# Patient Record
Sex: FEMALE | URBAN - METROPOLITAN AREA
[De-identification: names, ages, dates, MRNs, and addresses within clinical notes are randomized per-mention and may not be internally consistent; named-entity substitution may affect disease eponyms.]

---

## 2019-01-02 ENCOUNTER — APPOINTMENT (RX ONLY)
Dept: URBAN - METROPOLITAN AREA CLINIC 2 | Facility: CLINIC | Age: 69
Setting detail: DERMATOLOGY
End: 2019-01-02

## 2019-01-02 DIAGNOSIS — Z71.89 OTHER SPECIFIED COUNSELING: ICD-10-CM

## 2019-01-02 DIAGNOSIS — D22 MELANOCYTIC NEVI: ICD-10-CM

## 2019-01-02 PROBLEM — D23.5 OTHER BENIGN NEOPLASM OF SKIN OF TRUNK: Status: ACTIVE | Noted: 2019-01-02

## 2019-01-02 PROBLEM — D22.5 MELANOCYTIC NEVI OF TRUNK: Status: ACTIVE | Noted: 2019-01-02

## 2019-01-02 PROBLEM — J30.1 ALLERGIC RHINITIS DUE TO POLLEN: Status: ACTIVE | Noted: 2019-01-02

## 2019-01-02 PROBLEM — J45.909 UNSPECIFIED ASTHMA, UNCOMPLICATED: Status: ACTIVE | Noted: 2019-01-02

## 2019-01-02 PROCEDURE — 99213 OFFICE O/P EST LOW 20 MIN: CPT

## 2019-01-02 PROCEDURE — ? OBSERVATION

## 2019-01-02 ASSESSMENT — LOCATION DETAILED DESCRIPTION DERM
LOCATION DETAILED: RIGHT BUTTOCK
LOCATION DETAILED: LEFT LATERAL ABDOMEN

## 2019-01-02 ASSESSMENT — LOCATION SIMPLE DESCRIPTION DERM
LOCATION SIMPLE: ABDOMEN
LOCATION SIMPLE: RIGHT BUTTOCK

## 2019-01-02 ASSESSMENT — LOCATION ZONE DERM: LOCATION ZONE: TRUNK

## 2019-01-02 NOTE — PROCEDURE: OBSERVATION
Size Of Lesion In Cm (Optional): 0
Body Location Override (Optional - Billing Will Still Be Based On Selected Body Map Location If Applicable): R buttock
Detail Level: Detailed
Body Location Override (Optional - Billing Will Still Be Based On Selected Body Map Location If Applicable): L lateral waistline

## 2019-04-30 ENCOUNTER — APPOINTMENT (RX ONLY)
Dept: URBAN - METROPOLITAN AREA CLINIC 2 | Facility: CLINIC | Age: 69
Setting detail: DERMATOLOGY
End: 2019-04-30

## 2019-04-30 DIAGNOSIS — D22 MELANOCYTIC NEVI: ICD-10-CM

## 2019-04-30 PROBLEM — D22.5 MELANOCYTIC NEVI OF TRUNK: Status: ACTIVE | Noted: 2019-04-30

## 2019-04-30 PROCEDURE — ? OBSERVATION

## 2019-04-30 PROCEDURE — 99212 OFFICE O/P EST SF 10 MIN: CPT

## 2019-04-30 ASSESSMENT — LOCATION ZONE DERM: LOCATION ZONE: TRUNK

## 2019-04-30 ASSESSMENT — LOCATION SIMPLE DESCRIPTION DERM: LOCATION SIMPLE: RIGHT BUTTOCK

## 2019-04-30 ASSESSMENT — LOCATION DETAILED DESCRIPTION DERM: LOCATION DETAILED: RIGHT BUTTOCK

## 2019-04-30 NOTE — PROCEDURE: OBSERVATION
Body Location Override (Optional - Billing Will Still Be Based On Selected Body Map Location If Applicable): R buttock
Detail Level: Detailed
Size Of Lesion In Cm (Optional): 0

## 2020-08-11 ENCOUNTER — APPOINTMENT (RX ONLY)
Dept: URBAN - METROPOLITAN AREA CLINIC 2 | Facility: CLINIC | Age: 70
Setting detail: DERMATOLOGY
End: 2020-08-11

## 2020-08-11 VITALS — TEMPERATURE: 97.1 F

## 2020-08-11 DIAGNOSIS — Z41.9 ENCOUNTER FOR PROCEDURE FOR PURPOSES OTHER THAN REMEDYING HEALTH STATE, UNSPECIFIED: ICD-10-CM

## 2020-08-11 DIAGNOSIS — D22 MELANOCYTIC NEVI: ICD-10-CM

## 2020-08-11 DIAGNOSIS — L81.4 OTHER MELANIN HYPERPIGMENTATION: ICD-10-CM

## 2020-08-11 DIAGNOSIS — L82.1 OTHER SEBORRHEIC KERATOSIS: ICD-10-CM

## 2020-08-11 DIAGNOSIS — Z71.89 OTHER SPECIFIED COUNSELING: ICD-10-CM

## 2020-08-11 PROBLEM — D22.61 MELANOCYTIC NEVI OF RIGHT UPPER LIMB, INCLUDING SHOULDER: Status: ACTIVE | Noted: 2020-08-11

## 2020-08-11 PROBLEM — D23.5 OTHER BENIGN NEOPLASM OF SKIN OF TRUNK: Status: ACTIVE | Noted: 2020-08-11

## 2020-08-11 PROBLEM — D22.5 MELANOCYTIC NEVI OF TRUNK: Status: ACTIVE | Noted: 2020-08-11

## 2020-08-11 PROCEDURE — ? MEDICAL CONSULTATION: BROWN SPOTS

## 2020-08-11 PROCEDURE — ? COUNSELING

## 2020-08-11 PROCEDURE — 99214 OFFICE O/P EST MOD 30 MIN: CPT

## 2020-08-11 PROCEDURE — ? DEFER

## 2020-08-11 PROCEDURE — ? RECOMMENDATIONS

## 2020-08-11 ASSESSMENT — LOCATION DETAILED DESCRIPTION DERM
LOCATION DETAILED: INFERIOR THORACIC SPINE
LOCATION DETAILED: LEFT INFERIOR LATERAL MALAR CHEEK
LOCATION DETAILED: RIGHT ANTERIOR DISTAL THIGH
LOCATION DETAILED: RIGHT POSTERIOR SHOULDER
LOCATION DETAILED: RIGHT INFERIOR CENTRAL MALAR CHEEK

## 2020-08-11 ASSESSMENT — LOCATION SIMPLE DESCRIPTION DERM
LOCATION SIMPLE: UPPER BACK
LOCATION SIMPLE: RIGHT CHEEK
LOCATION SIMPLE: LEFT CHEEK
LOCATION SIMPLE: RIGHT SHOULDER
LOCATION SIMPLE: RIGHT THIGH

## 2020-08-11 ASSESSMENT — LOCATION ZONE DERM
LOCATION ZONE: TRUNK
LOCATION ZONE: FACE
LOCATION ZONE: LEG
LOCATION ZONE: ARM

## 2020-08-11 NOTE — PROCEDURE: RECOMMENDATIONS
Detail Level: Generalized
Recommendation Preamble: The following recommendations were made during the visit: Mj FELDER retinol

## 2020-11-30 ENCOUNTER — APPOINTMENT (RX ONLY)
Dept: URBAN - METROPOLITAN AREA CLINIC 41 | Facility: CLINIC | Age: 70
Setting detail: DERMATOLOGY
End: 2020-11-30

## 2020-11-30 DIAGNOSIS — Z41.9 ENCOUNTER FOR PROCEDURE FOR PURPOSES OTHER THAN REMEDYING HEALTH STATE, UNSPECIFIED: ICD-10-CM

## 2020-11-30 PROCEDURE — ? SYNERON IPL

## 2020-11-30 ASSESSMENT — LOCATION ZONE DERM: LOCATION ZONE: FACE

## 2020-11-30 ASSESSMENT — LOCATION DETAILED DESCRIPTION DERM: LOCATION DETAILED: LEFT INFERIOR CENTRAL MALAR CHEEK

## 2020-11-30 ASSESSMENT — LOCATION SIMPLE DESCRIPTION DERM: LOCATION SIMPLE: LEFT CHEEK

## 2020-11-30 NOTE — PROCEDURE: SYNERON IPL
Treatment Number: 1
External Cooling Fan Speed: 5
Pre-Procedure: The treatment areas identified by the patient were cleansed, gel was applied and treatment was performed with the parameters mentioned above.
Consent: Written consent obtained, risks reviewed including but not limited to crusting, scabbing, blistering, scarring, darker or lighter pigmentary change, bruising, and/or incomplete response.
Rf Energy: 10
Spot Size(S): 22 x 35 mm
Pulse Type: Short
Post-Care Instructions: I reviewed with the patient in detail post-care instructions. Patient should stay away from the sun and wear sun protection until treated areas are fully healed.
Post-Procedure: Following the procedure the post care instructions were reviewed with the patient.
Wavelenth: KAUR 470-980nm
Detail Level: Zone

## 2021-01-18 ENCOUNTER — APPOINTMENT (RX ONLY)
Dept: URBAN - METROPOLITAN AREA CLINIC 41 | Facility: CLINIC | Age: 71
Setting detail: DERMATOLOGY
End: 2021-01-18

## 2021-01-18 DIAGNOSIS — Z41.9 ENCOUNTER FOR PROCEDURE FOR PURPOSES OTHER THAN REMEDYING HEALTH STATE, UNSPECIFIED: ICD-10-CM

## 2021-01-18 PROCEDURE — ? SYNERON IPL

## 2021-01-18 ASSESSMENT — LOCATION DETAILED DESCRIPTION DERM: LOCATION DETAILED: RIGHT INFERIOR CENTRAL MALAR CHEEK

## 2021-01-18 ASSESSMENT — LOCATION SIMPLE DESCRIPTION DERM: LOCATION SIMPLE: RIGHT CHEEK

## 2021-01-18 ASSESSMENT — LOCATION ZONE DERM: LOCATION ZONE: FACE

## 2021-01-18 NOTE — PROCEDURE: SYNERON IPL
Post-Procedure: Following the procedure the post care instructions were reviewed with the patient.
Total Pulses: 1
Post-Care Instructions: I reviewed with the patient in detail post-care instructions. Patient should stay away from the sun and wear sun protection until treated areas are fully healed.
Rf Energy: 10
Consent: Written consent obtained, risks reviewed including but not limited to crusting, scabbing, blistering, scarring, darker or lighter pigmentary change, bruising, and/or incomplete response.
Pre-Procedure: The treatment areas identified by the patient were cleansed, gel was applied and treatment was performed with the parameters mentioned above.
Detail Level: Zone
Wavelenth: KAUR 470-980nm
External Cooling Fan Speed: 5
Spot Size(S): 22 x 35 mm
Pulse Type: Short

## 2021-03-24 ENCOUNTER — APPOINTMENT (RX ONLY)
Dept: URBAN - METROPOLITAN AREA CLINIC 41 | Facility: CLINIC | Age: 71
Setting detail: DERMATOLOGY
End: 2021-03-24

## 2021-03-24 DIAGNOSIS — Z41.9 ENCOUNTER FOR PROCEDURE FOR PURPOSES OTHER THAN REMEDYING HEALTH STATE, UNSPECIFIED: ICD-10-CM

## 2021-03-24 PROCEDURE — ? COSMETIC CONSULTATION: LASER RESURFACING

## 2021-03-24 PROCEDURE — ? BOTOX

## 2021-03-24 PROCEDURE — ? OTHER

## 2021-03-24 PROCEDURE — ? COSMETIC CONSULTATION: FILLERS

## 2021-03-24 ASSESSMENT — LOCATION SIMPLE DESCRIPTION DERM
LOCATION SIMPLE: RIGHT CHEEK
LOCATION SIMPLE: INFERIOR FOREHEAD
LOCATION SIMPLE: LEFT CHEEK

## 2021-03-24 ASSESSMENT — LOCATION DETAILED DESCRIPTION DERM
LOCATION DETAILED: RIGHT INFERIOR CENTRAL MALAR CHEEK
LOCATION DETAILED: LEFT INFERIOR CENTRAL MALAR CHEEK
LOCATION DETAILED: INFERIOR MID FOREHEAD

## 2021-03-24 ASSESSMENT — LOCATION ZONE DERM: LOCATION ZONE: FACE

## 2021-03-24 NOTE — PROCEDURE: OTHER
Render Risk Assessment In Note?: no
Detail Level: Simple
Other (Free Text): Discussed off label use for the glabella; discussed several treatment sessions. Risks and limitations discussed.
Note Text (......Xxx Chief Complaint.): This diagnosis correlates with the

## 2021-03-24 NOTE — PROCEDURE: BOTOX
Levator Labii Superioris Units: 0
Show Orbicularis Oculi Units: Yes
Consent: Written consent obtained. Risks include but not limited to lid/brow ptosis, bruising, swelling, diplopia temporary effect, incomplete chemical denervation
Lot #: [id number]
Show Right And Left Periorbital Units: No
Expiration Date (Month Year): 06/2023
Additional Area 1 Location: glabella, forehead
Additional Area 5 Location: neck
Detail Level: Simple
Dilution (U/0.1 Cc): 1
Additional Area 4 Location: chin
Additional Area 3 Location: see diagram
Additional Area 1 Units: 25

## 2021-03-24 NOTE — PROCEDURE: COSMETIC CONSULTATION: LASER RESURFACING
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
Detail Level: Zone

## 2021-05-11 ENCOUNTER — APPOINTMENT (RX ONLY)
Dept: URBAN - METROPOLITAN AREA CLINIC 41 | Facility: CLINIC | Age: 71
Setting detail: DERMATOLOGY
End: 2021-05-11

## 2021-05-11 DIAGNOSIS — Z41.9 ENCOUNTER FOR PROCEDURE FOR PURPOSES OTHER THAN REMEDYING HEALTH STATE, UNSPECIFIED: ICD-10-CM

## 2021-05-11 PROCEDURE — ? SYNERON IPL

## 2021-05-11 ASSESSMENT — LOCATION SIMPLE DESCRIPTION DERM: LOCATION SIMPLE: LEFT CHEEK

## 2021-05-11 ASSESSMENT — LOCATION ZONE DERM: LOCATION ZONE: FACE

## 2021-05-11 ASSESSMENT — LOCATION DETAILED DESCRIPTION DERM: LOCATION DETAILED: LEFT CENTRAL MALAR CHEEK

## 2021-05-11 NOTE — PROCEDURE: SYNERON IPL
Rf Energy: 25
Total Pulses: 1
Post-Procedure: Following the procedure the post care instructions were reviewed with the patient.
Post-Care Instructions: I reviewed with the patient in detail post-care instructions. Patient should stay away from the sun and wear sun protection until treated areas are fully healed.
Pre-Procedure: The treatment areas identified by the patient were cleansed, gel was applied and treatment was performed with the parameters mentioned above.
Detail Level: Detailed
Wavelenth: -980nm
Consent: Written consent obtained, risks reviewed including but not limited to crusting, scabbing, blistering, scarring, darker or lighter pigmentary change, bruising, and/or incomplete response.
Pulse Type: Short
Spot Size(S): 22 x 35 mm
External Cooling Fan Speed: 5
Fluence: 16

## 2021-12-01 ENCOUNTER — APPOINTMENT (RX ONLY)
Dept: URBAN - METROPOLITAN AREA CLINIC 41 | Facility: CLINIC | Age: 71
Setting detail: DERMATOLOGY
End: 2021-12-01

## 2021-12-01 DIAGNOSIS — Z41.9 ENCOUNTER FOR PROCEDURE FOR PURPOSES OTHER THAN REMEDYING HEALTH STATE, UNSPECIFIED: ICD-10-CM

## 2021-12-01 PROCEDURE — ? CHEMICAL PEEL JESSNER/TCA

## 2021-12-01 ASSESSMENT — LOCATION ZONE DERM: LOCATION ZONE: FACE

## 2021-12-01 ASSESSMENT — LOCATION DETAILED DESCRIPTION DERM: LOCATION DETAILED: LEFT INFERIOR CENTRAL MALAR CHEEK

## 2021-12-01 ASSESSMENT — LOCATION SIMPLE DESCRIPTION DERM: LOCATION SIMPLE: LEFT CHEEK

## 2021-12-01 NOTE — PROCEDURE: CHEMICAL PEEL JESSNER/TCA
Consent: Prior to the procedure, verbal consent was obtained and risks were reviewed, including but not limited to: redness, peeling, blistering, pigmentary change, scarring, infection, and pain.\\nPt denies allergy to aspirin/salicylic acid. Verbal consent obtained by pt.
Treatment Number: 0
Erythema: mild
Frost (0,1+,2+,3+,4+): 4+
Chemical Peel: Jessners
Post Peel Care: After the procedure, the treatment area was washed with soap and water, and a post-peel cream was applied. Sun protection and post-care instructions were reviewed with the patient.
Detail Level: Zone
Post-Care Instructions: I reviewed with the patient in detail post-care instructions. Patient should avoid sun exposure and wear sun protection. Do not pick at peeling skin. Ok to wash face 4 hours post Jessners peel, 12 hours post Glytone peels. Moisturizer frequently & PRN. return to normal skin care when peeling is complete, including daily SPF.
Number Of Coats: 6
Prep: The treated area was degreased with pre-peel cleanser, and vaseline was applied for protection of mucous membranes.

## 2022-01-18 ENCOUNTER — APPOINTMENT (RX ONLY)
Dept: URBAN - METROPOLITAN AREA CLINIC 41 | Facility: CLINIC | Age: 72
Setting detail: DERMATOLOGY
End: 2022-01-18

## 2022-01-18 DIAGNOSIS — Z41.9 ENCOUNTER FOR PROCEDURE FOR PURPOSES OTHER THAN REMEDYING HEALTH STATE, UNSPECIFIED: ICD-10-CM

## 2022-01-18 PROCEDURE — ? SYNERON IPL

## 2022-01-18 ASSESSMENT — LOCATION DETAILED DESCRIPTION DERM: LOCATION DETAILED: LEFT INFERIOR CENTRAL MALAR CHEEK

## 2022-01-18 ASSESSMENT — LOCATION SIMPLE DESCRIPTION DERM: LOCATION SIMPLE: LEFT CHEEK

## 2022-01-18 ASSESSMENT — LOCATION ZONE DERM: LOCATION ZONE: FACE

## 2022-01-18 NOTE — PROCEDURE: SYNERON IPL
Fluence: 17
Wavelenth: -980nm
External Cooling Fan Speed: 5
Detail Level: Detailed
Pulse Type: Short
Number Of Passes: 1
Spot Size(S): 22 x 35 mm
Post-Care Instructions: I reviewed with the patient in detail post-care instructions. Patient should stay away from the sun and wear sun protection until treated areas are fully healed.
Post-Procedure: Following the procedure the post care instructions were reviewed with the patient.
Rf Energy: 25
Consent: Written consent obtained, risks reviewed including but not limited to crusting, scabbing, blistering, scarring, darker or lighter pigmentary change, bruising, and/or incomplete response.
Pre-Procedure: The treatment areas identified by the patient were cleansed, gel was applied and treatment was performed with the parameters mentioned above.

## 2022-02-21 ENCOUNTER — APPOINTMENT (RX ONLY)
Dept: URBAN - METROPOLITAN AREA CLINIC 41 | Facility: CLINIC | Age: 72
Setting detail: DERMATOLOGY
End: 2022-02-21

## 2022-02-21 DIAGNOSIS — Z41.9 ENCOUNTER FOR PROCEDURE FOR PURPOSES OTHER THAN REMEDYING HEALTH STATE, UNSPECIFIED: ICD-10-CM

## 2022-02-21 PROCEDURE — ? SYNERON IPL

## 2022-02-21 ASSESSMENT — LOCATION DETAILED DESCRIPTION DERM
LOCATION DETAILED: LEFT MEDIAL MANDIBULAR CHEEK
LOCATION DETAILED: LEFT CENTRAL MALAR CHEEK

## 2022-02-21 ASSESSMENT — LOCATION SIMPLE DESCRIPTION DERM: LOCATION SIMPLE: LEFT CHEEK

## 2022-02-21 ASSESSMENT — LOCATION ZONE DERM: LOCATION ZONE: FACE

## 2022-02-21 NOTE — PROCEDURE: SYNERON IPL
Spot Size(S): 22 x 35 mm
External Cooling Fan Speed: 5
Post-Procedure: Following the procedure the post care instructions were reviewed with the patient.
Detail Level: Detailed
Post-Care Instructions: I reviewed with the patient in detail post-care instructions. Patient should stay away from the sun and wear sun protection until treated areas are fully healed.
Wavelenth: -980nm
Fluence: 17
Number Of Passes: 1
Pulse Type: Short
Rf Energy: 25
Consent: Written consent obtained, risks reviewed including but not limited to crusting, scabbing, blistering, scarring, darker or lighter pigmentary change, bruising, and/or incomplete response.
Pre-Procedure: The treatment areas identified by the patient were cleansed, gel was applied and treatment was performed with the parameters mentioned above.

## 2024-10-01 ENCOUNTER — APPOINTMENT (RX ONLY)
Dept: URBAN - METROPOLITAN AREA CLINIC 2 | Facility: CLINIC | Age: 74
Setting detail: DERMATOLOGY
End: 2024-10-01

## 2024-10-01 DIAGNOSIS — L82.1 OTHER SEBORRHEIC KERATOSIS: ICD-10-CM

## 2024-10-01 DIAGNOSIS — D22 MELANOCYTIC NEVI: ICD-10-CM

## 2024-10-01 DIAGNOSIS — R20.2 PARESTHESIA OF SKIN: ICD-10-CM

## 2024-10-01 DIAGNOSIS — L57.8 OTHER SKIN CHANGES DUE TO CHRONIC EXPOSURE TO NONIONIZING RADIATION: ICD-10-CM

## 2024-10-01 DIAGNOSIS — Z71.89 OTHER SPECIFIED COUNSELING: ICD-10-CM

## 2024-10-01 PROBLEM — D22.71 MELANOCYTIC NEVI OF RIGHT LOWER LIMB, INCLUDING HIP: Status: ACTIVE | Noted: 2024-10-01

## 2024-10-01 PROBLEM — D22.5 MELANOCYTIC NEVI OF TRUNK: Status: ACTIVE | Noted: 2024-10-01

## 2024-10-01 PROCEDURE — 99213 OFFICE O/P EST LOW 20 MIN: CPT

## 2024-10-01 PROCEDURE — ? SUNSCREEN RECOMMENDATIONS

## 2024-10-01 PROCEDURE — ? OBSERVATION AND MEASURE

## 2024-10-01 PROCEDURE — ? PHOTO-DOCUMENTATION

## 2024-10-01 PROCEDURE — ? COUNSELING

## 2024-10-01 ASSESSMENT — LOCATION SIMPLE DESCRIPTION DERM
LOCATION SIMPLE: RIGHT KNEE
LOCATION SIMPLE: LEFT PRETIBIAL REGION
LOCATION SIMPLE: RIGHT UPPER BACK
LOCATION SIMPLE: UPPER BACK

## 2024-10-01 ASSESSMENT — LOCATION DETAILED DESCRIPTION DERM
LOCATION DETAILED: RIGHT INFERIOR UPPER BACK
LOCATION DETAILED: LEFT PROXIMAL PRETIBIAL REGION
LOCATION DETAILED: INFERIOR THORACIC SPINE
LOCATION DETAILED: RIGHT MEDIAL KNEE

## 2024-10-01 ASSESSMENT — LOCATION ZONE DERM
LOCATION ZONE: LEG
LOCATION ZONE: TRUNK

## 2024-10-01 NOTE — PROCEDURE: COUNSELING
Detail Level: Zone
Sunscreen Recommendations: Discussed importance of regular photo protection with sun screen and photo protective clothing
Detail Level: Generalized
Sunscreen Recommendations: At home photo documentation will help tracking for any changes.
Capsaicin Cream Recommendations: Prn
Detail Level: Simple

## 2024-11-05 ENCOUNTER — APPOINTMENT (RX ONLY)
Dept: URBAN - METROPOLITAN AREA CLINIC 41 | Facility: CLINIC | Age: 74
Setting detail: DERMATOLOGY
End: 2024-11-05

## 2024-11-05 DIAGNOSIS — Z41.9 ENCOUNTER FOR PROCEDURE FOR PURPOSES OTHER THAN REMEDYING HEALTH STATE, UNSPECIFIED: ICD-10-CM

## 2024-11-05 PROCEDURE — ? ALMA LASER

## 2024-11-05 ASSESSMENT — LOCATION SIMPLE DESCRIPTION DERM: LOCATION SIMPLE: INFERIOR FOREHEAD

## 2024-11-05 ASSESSMENT — LOCATION DETAILED DESCRIPTION DERM: LOCATION DETAILED: INFERIOR MID FOREHEAD

## 2024-11-05 ASSESSMENT — LOCATION ZONE DERM: LOCATION ZONE: FACE

## 2024-11-05 NOTE — PROCEDURE: ALMA LASER
Treatment Number: 1
Fluence (J/Cm2): 10
Uv Post-Care: Post care reviewed with patient.
Ipl Post-Care: Vaseline and ice applied. Post care reviewed with patient.
Overlap (Optional): 50%
Spot Size In Mm: 3
Detail Level: Detailed
Frequency (Optional): 2 Hz
Frequency In Hz: Unibody
External Cooling Fan Speed: 5
Energy In Mj: 400
Plasma Intensity (%): 25
Clear Lift Post-Care: Post care reviewed with patient. Patient should avoid direct sunlight and wear broad spectrum sunscreen daily.
Fluence (Mj/Cm2): 4
Number Of Pulses: 86
Passes (Optional): 2
Fluence (J/Cm2): 13
Power In Deluca: 50
Energy (Optional): 1200 mJ/P
Nd:Yag Post-Care: Immediate endpoint whitening and pinpoint bleeding. Vaseline and ice applied. Post care reviewed with patient.
Pulse Mode (Optional): L
Energy (Mj/P): 600
Pulse Width: 30 ms
Consent: Written consent obtained, risks reviewed including but not limited to crusting, scabbing, blistering, scarring, darker or lighter pigmentary change, systemic reactions, ulceration, incidental hair removal, bruising, and/or incomplete removal.
Post-Care Instructions: I reviewed with the patient in detail post-care instructions. Patient should avoid sunlight and wear sun protection.
Post-Care To Use?: Generic

## 2025-01-09 ENCOUNTER — APPOINTMENT (OUTPATIENT)
Age: 75
Setting detail: DERMATOLOGY
End: 2025-01-09

## 2025-01-09 DIAGNOSIS — Z41.9 ENCOUNTER FOR PROCEDURE FOR PURPOSES OTHER THAN REMEDYING HEALTH STATE, UNSPECIFIED: ICD-10-CM

## 2025-01-09 PROCEDURE — ? ALMA LASER

## 2025-01-09 ASSESSMENT — LOCATION DETAILED DESCRIPTION DERM
LOCATION DETAILED: RIGHT CENTRAL MALAR CHEEK
LOCATION DETAILED: RIGHT CHIN
LOCATION DETAILED: LEFT MEDIAL MALAR CHEEK
LOCATION DETAILED: RIGHT MEDIAL FOREHEAD

## 2025-01-09 ASSESSMENT — LOCATION SIMPLE DESCRIPTION DERM
LOCATION SIMPLE: CHIN
LOCATION SIMPLE: LEFT CHEEK
LOCATION SIMPLE: RIGHT FOREHEAD
LOCATION SIMPLE: RIGHT CHEEK

## 2025-01-09 ASSESSMENT — LOCATION ZONE DERM: LOCATION ZONE: FACE

## 2025-01-09 NOTE — PROCEDURE: ALMA LASER
Energy (Optional): 1200 mJ/P
Shr Post-Care: Post care reviewed with patient.
Passes: 1
Nd:Yag Post-Care: Immediate endpoint whitening and pinpoint bleeding. Vaseline and ice applied. Post care reviewed with patient.
Fluence (J/Cm2): 13
Handpiece: Uniform
Detail Level: Detailed
Overlap (Optional): 50%
Plasma Intensity (%): 25
Fluence (J/Cm2): 10
External Cooling: SmartCool
Ipl Post-Care: Vaseline and ice applied. Post care reviewed with patient.
Fluence (Mj/Cm2): 4
Energy (Mj/P): 600
Spot Size In Mm: 3
Total Energy (Kj): 6
Frequency In Hz: Unibody
Frequency (Optional): 2 Hz
External Cooling Fan Speed: 5
Pulse Mode (Optional): L
Clear Lift Post-Care: Post care reviewed with patient. Patient should avoid direct sunlight and wear broad spectrum sunscreen daily.
Post-Care To Use?: Generic
Consent: Written consent obtained, risks reviewed including but not limited to crusting, scabbing, blistering, scarring, darker or lighter pigmentary change, systemic reactions, ulceration, incidental hair removal, bruising, and/or incomplete removal.
Energy In Mj: 400
Post-Care Instructions: I reviewed with the patient in detail post-care instructions. Patient should avoid sunlight and wear sun protection.
Power In Deluca: 50
Passes (Optional): 2
Pulse Width: 30 ms

## 2025-02-20 ENCOUNTER — APPOINTMENT (OUTPATIENT)
Age: 75
Setting detail: DERMATOLOGY
End: 2025-02-20

## 2025-02-20 DIAGNOSIS — Z41.9 ENCOUNTER FOR PROCEDURE FOR PURPOSES OTHER THAN REMEDYING HEALTH STATE, UNSPECIFIED: ICD-10-CM

## 2025-02-20 PROCEDURE — ? ALMA LASER

## 2025-02-20 ASSESSMENT — LOCATION SIMPLE DESCRIPTION DERM: LOCATION SIMPLE: RIGHT CHEEK

## 2025-02-20 ASSESSMENT — LOCATION DETAILED DESCRIPTION DERM: LOCATION DETAILED: RIGHT INFERIOR CENTRAL MALAR CHEEK

## 2025-02-20 ASSESSMENT — LOCATION ZONE DERM: LOCATION ZONE: FACE

## 2025-02-20 NOTE — PROCEDURE: ALMA LASER
Treatment Number: 1
Pulse Width: 50
Yanely Post-Care: Immediate endpoint whitening and pinpoint bleeding. Vaseline and ice applied. Post care reviewed with patient.
Energy (Optional): 1200 mJ/P
Uv Post-Care: Post care reviewed with patient.
Fluence (Mj/Cm2): 4
Detail Level: Detailed
Ipl Post-Care: Vaseline and ice applied. Post care reviewed with patient.
Fluence (J/Cm2): 10
Overlap (Optional): 50%
Handpiece: Uniform
Consent: Written consent obtained, risks reviewed including but not limited to crusting, scabbing, blistering, scarring, darker or lighter pigmentary change, systemic reactions, ulceration, incidental hair removal, bruising, and/or incomplete removal.
Spot Size In Mm: 3
Post-Care Instructions: I reviewed with the patient in detail post-care instructions. Patient should avoid sunlight and wear sun protection.
Frequency In Hz: Unibody
Frequency (Optional): 2 Hz
Energy (Mj/P): 600
Clear Lift Post-Care: Post care reviewed with patient. Patient should avoid direct sunlight and wear broad spectrum sunscreen daily.
External Cooling: SmartCool
Energy In Mj: 400
Plasma Intensity (%): 25
Total Energy (Kj): 6
Pulse Mode (Optional): L
Passes (Optional): 2
Pulse Width: 30 ms
External Cooling Fan Speed: 5
Fluence (J/Cm2): 15
Fluence (J/Cm2): 13
Post-Care To Use?: Generic